# Patient Record
(demographics unavailable — no encounter records)

---

## 2017-09-08 NOTE — EMERGENCY ROOM REPORT
History of Present Illness


General


Chief Complaint:  Alcohol Intoxication


Source:  Patient, EMS





Present Illness


HPI


51YOM BIBEMS for ETOH, chest pain


Drank "the whole store" worth of Alexia all day today because he "has pains in 

his feet" and alexia helps.


EMS states they are always at his house for "him or his brother" drinking ETOH 

excessively


Denies any drug use


Had chest pain "all day, before I started drinking."  States had some vomiting 

then substernal chest pain as well


Questionable timeframe given patient very intoxicated currently


Denies PMHx


Denies taking medication


Allergies:  


Coded Allergies:  


     UNABLE TO ASSESS (Unverified , 1/26/15)





Patient History


Past Medical History:  none


Past Surgical History:  none


Pertinent Family History:  none


Social History:  Reports: alcohol use


Immunizations:  UTD


Reviewed Nursing Documentation:  PMH: Agreed, PSxH: Agreed





Nursing Documentation-PMH


Hx Hypertension:  Yes





Review of Systems


All Other Systems:  negative except mentioned in HPI





Physical Exam





Vital Signs








  Date Time  Temp Pulse Resp B/P (MAP) Pulse Ox O2 Delivery O2 Flow Rate FiO2


 


9/8/17 18:41 98.2 72 18 158/98 99 Room Air  








Sp02 EP Interpretation:  reviewed, normal


General Appearance:  normal inspection, well appearing, no apparent distress, 

alert, GCS 15, non-toxic, other - +AOB


Head:  normocephalic, atraumatic


Eyes:  bilateral eye PERRL, bilateral eye EOMI


ENT:  normal ENT inspection, hearing grossly normal, normal voice


Neck:  normal inspection, full range of motion, supple, no bony tend


Respiratory:  normal inspection, lungs clear, normal breath sounds, no 

respiratory distress, no retraction, no accessory muscle use, no wheezing, 

speaking full sentences, chest symmetrical, palpation of chest normal


Cardiovascular #1:  regular rate, rhythm, no edema


Gastrointestinal:  normal inspection, normal bowel sounds, soft, no guarding, 

no hernia, other - TTP epigastrium


Genitourinary:  no CVA tenderness


Musculoskeletal:  normal inspection, back normal, normal range of motion, Thom'

s Sign negative


Neurologic:  normal inspection, alert, oriented x3, responsive, CNs III-XII nml 

as tested, motor strength/tone normal, speech normal


Psychiatric:  normal inspection, judgement/insight normal, mood/affect normal


Skin:  normal inspection, normal color, no rash


Lymphatic:  normal inspection





Medical Decision Making


Diagnostic Impression:  


 Primary Impression:  


 Acute alcoholic intoxication


 Qualified Codes:  F10.929 - Alcohol use, unspecified with intoxication, 

unspecified


 Additional Impressions:  


 Chest pain


 Qualified Codes:  R07.9 - Chest pain, unspecified


 Epigastric pain


 Alcoholic gastritis


 Qualified Codes:  K29.20 - Alcoholic gastritis without bleeding


ER Course


CXR negative for PTX, perforation/free air


No ttp/ crepitus to chest


Lungs CTAB 


ECG is NSR, no ischemia


ETOH gastritis, given epigastric pain, daily ETOH improved with GI Cocktail


Tolerating PO


Observed in ER for 2 hours, patient slept


No sober, ambulating with steady gait


DC home


EKG Diagnostic Results


Rate:  normal


Rhythm:  NSR


ST Segments:  no acute changes





Rhythm Strip Diag. Results


EP Interpretation:  yes


Rate:  75


Rhythm:  NSR, no PVC's, no ectopy





Chest X-Ray Diagnostic Results


Chest X-Ray Diagnostic Results :  


   Chest X-Ray Ordered:  Yes


   # of Views/Limited/Complete:  1 View


   Indication:  Chest Pain


   EP Interpretation:  Yes


   Interpretation:  no consolidation, no effusion, no pneumothorax, no acute 

cardiopulmonary disease


   Impression:  No acute disease


   Electronically Signed by:  Dr Froilan Zarate MD





Last Vital Signs








  Date Time  Temp Pulse Resp B/P (MAP) Pulse Ox O2 Delivery O2 Flow Rate FiO2


 


9/8/17 18:41 98.2 72 18 158/98 99 Room Air  








Status:  improved


Disposition:  HOME, SELF-CARE











FROILAN ZARATE M.D. Sep 8, 2017 18:49

## 2017-09-11 NOTE — CARDIOLOGY REPORT
--------------- APPROVED REPORT --------------





EKG Measurement

Heart [id number]

MT [id number]

[id number]

[id number]

[id number]





Normal sinus rhythm

Nonspecific ST abnormality

Abnormal ECG

## 2018-03-18 NOTE — EMERGENCY ROOM REPORT
History of Present Illness


General


Chief Complaint:  Alcohol Intoxication


Source:  Patient, Medical Record, EMS





Present Illness


HPI


This is a 52-year-old male with a history of colon abuse.  He also has history 

of chronic pain for which she medicated with alcohol.  Per EMS had been out to 

his placed multiple times.  Patient called 911 because of generalized body 

pain.  Also admitted to drinking heavily today.  No fever chills but no nausea 

no vomiting.  Pain is 10 out of 10.  No other complaint.  No trauma.  No fever.


Allergies:  


Coded Allergies:  


     No Known Allergies (Unverified , 3/18/18)


     UNABLE TO ASSESS (Unverified , 1/26/15)





Patient History


Past Medical History:  see triage record, old chart reviewed


Past Surgical History:  other


Pertinent Family History:  none


Social History:  Reports: alcohol use


Immunizations:  other


Reviewed Nursing Documentation:  PMH: Agreed, PSxH: Agreed





Nursing Documentation-PMH


Hx Hypertension:  Yes





Review of Systems


Eye:  Denies: eye pain, blurred vision


ENT:  Denies: ear pain, nose congestion, throat swelling


Respiratory:  Denies: cough, shortness of breath


Cardiovascular:  Denies: chest pain, palpitations


Gastrointestinal:  Denies: abdominal pain, diarrhea, nausea, vomiting


Musculoskeletal:  Reports: muscle pain, Denies: back pain, joint pain


Skin:  Denies: rash


Neurological:  Denies: headache, numbness


Endocrine:  Denies: increased thirst, increased urine


Hematologic/Lymphatic:  Denies: easy bruising


All Other Systems:  negative except mentioned in HPI





Physical Exam





Vital Signs








  Date Time  Temp Pulse Resp B/P (MAP) Pulse Ox O2 Delivery O2 Flow Rate FiO2


 


3/18/18 20:56 97.4 95 16 160/93 94 Room Air  





 97.3       





vitals with high blood pressure


Sp02 EP Interpretation:  reviewed, normal


General Appearance:  well appearing, no apparent distress, alert, other - Very 

intoxicated


Head:  normocephalic, atraumatic


Eyes:  bilateral eye PERRL, bilateral eye EOMI


ENT:  hearing grossly normal, normal pharynx


Neck:  full range of motion, supple, no meningismus


Respiratory:  chest non-tender, lungs clear, normal breath sounds


Cardiovascular #1:  regular rate, rhythm, no murmur


Gastrointestinal:  normal bowel sounds, non tender, no mass, no organomegaly, 

no bruit, non-distended


Musculoskeletal:  back normal, normal range of motion


Neurologic:  grossly normal


Psychiatric:  mood/affect normal


Skin:  warm/dry





Medical Decision Making


Diagnostic Impression:  


 Primary Impression:  


 Acute alcoholic intoxication


 Qualified Codes:  F10.929 - Alcohol use, unspecified with intoxication, 

unspecified


ER Course


Patient presents with alcohol intoxication.  He slept for one hour and now 

awake and walking around without any difficulty.  Was to go home.  No trauma to 

warrant x-ray or CT scan.





Last Vital Signs








  Date Time  Temp Pulse Resp B/P (MAP) Pulse Ox O2 Delivery O2 Flow Rate FiO2


 


3/18/18 20:56 97.4 95 16 160/93 94 Room Air  





 97.3       








Status:  improved


Disposition:  HOME, SELF-CARE


Condition:  Stable


Patient Instructions:  Alcohol Intoxication, Easy-to-Read





Additional Instructions:  


Follow-up with your Dr. in 7 days.  Return if symptom worsen.











[place] M.D. Mar 18, 2018 21:06